# Patient Record
Sex: FEMALE | Race: ASIAN | NOT HISPANIC OR LATINO | URBAN - METROPOLITAN AREA
[De-identification: names, ages, dates, MRNs, and addresses within clinical notes are randomized per-mention and may not be internally consistent; named-entity substitution may affect disease eponyms.]

---

## 2023-04-10 ENCOUNTER — INPATIENT (INPATIENT)
Facility: HOSPITAL | Age: 73
LOS: 2 days | Discharge: HOME | End: 2023-04-13
Attending: STUDENT IN AN ORGANIZED HEALTH CARE EDUCATION/TRAINING PROGRAM | Admitting: STUDENT IN AN ORGANIZED HEALTH CARE EDUCATION/TRAINING PROGRAM
Payer: MEDICARE

## 2023-04-10 VITALS
HEIGHT: 66 IN | HEART RATE: 58 BPM | OXYGEN SATURATION: 99 % | DIASTOLIC BLOOD PRESSURE: 60 MMHG | RESPIRATION RATE: 16 BRPM | SYSTOLIC BLOOD PRESSURE: 90 MMHG | TEMPERATURE: 98 F | WEIGHT: 179.9 LBS

## 2023-04-10 LAB
ALBUMIN SERPL ELPH-MCNC: 4 G/DL — SIGNIFICANT CHANGE UP (ref 3.3–5)
ALP SERPL-CCNC: 90 U/L — SIGNIFICANT CHANGE UP (ref 40–120)
ALT FLD-CCNC: 54 U/L — SIGNIFICANT CHANGE UP (ref 12–78)
ANION GAP SERPL CALC-SCNC: 7 MMOL/L — SIGNIFICANT CHANGE UP (ref 5–17)
AST SERPL-CCNC: 32 U/L — SIGNIFICANT CHANGE UP (ref 15–37)
BASOPHILS # BLD AUTO: 0.12 K/UL — SIGNIFICANT CHANGE UP (ref 0–0.2)
BASOPHILS NFR BLD AUTO: 0.7 % — SIGNIFICANT CHANGE UP (ref 0–2)
BILIRUB SERPL-MCNC: 0.6 MG/DL — SIGNIFICANT CHANGE UP (ref 0.2–1.2)
BUN SERPL-MCNC: 27 MG/DL — HIGH (ref 7–23)
CALCIUM SERPL-MCNC: 9.3 MG/DL — SIGNIFICANT CHANGE UP (ref 8.5–10.1)
CHLORIDE SERPL-SCNC: 111 MMOL/L — HIGH (ref 96–108)
CO2 SERPL-SCNC: 23 MMOL/L — SIGNIFICANT CHANGE UP (ref 22–31)
CREAT SERPL-MCNC: 1.78 MG/DL — HIGH (ref 0.5–1.3)
EGFR: 30 ML/MIN/1.73M2 — LOW
EOSINOPHIL # BLD AUTO: 0.07 K/UL — SIGNIFICANT CHANGE UP (ref 0–0.5)
EOSINOPHIL NFR BLD AUTO: 0.4 % — SIGNIFICANT CHANGE UP (ref 0–6)
GLUCOSE SERPL-MCNC: 136 MG/DL — HIGH (ref 70–99)
HCT VFR BLD CALC: 43.4 % — SIGNIFICANT CHANGE UP (ref 34.5–45)
HGB BLD-MCNC: 14 G/DL — SIGNIFICANT CHANGE UP (ref 11.5–15.5)
IMM GRANULOCYTES NFR BLD AUTO: 0.6 % — SIGNIFICANT CHANGE UP (ref 0–0.9)
LYMPHOCYTES # BLD AUTO: 14.3 % — SIGNIFICANT CHANGE UP (ref 13–44)
LYMPHOCYTES # BLD AUTO: 2.42 K/UL — SIGNIFICANT CHANGE UP (ref 1–3.3)
MAGNESIUM SERPL-MCNC: 2.5 MG/DL — SIGNIFICANT CHANGE UP (ref 1.6–2.6)
MCHC RBC-ENTMCNC: 29.5 PG — SIGNIFICANT CHANGE UP (ref 27–34)
MCHC RBC-ENTMCNC: 32.3 G/DL — SIGNIFICANT CHANGE UP (ref 32–36)
MCV RBC AUTO: 91.4 FL — SIGNIFICANT CHANGE UP (ref 80–100)
MONOCYTES # BLD AUTO: 1.04 K/UL — HIGH (ref 0–0.9)
MONOCYTES NFR BLD AUTO: 6.1 % — SIGNIFICANT CHANGE UP (ref 2–14)
NEUTROPHILS # BLD AUTO: 13.19 K/UL — HIGH (ref 1.8–7.4)
NEUTROPHILS NFR BLD AUTO: 77.9 % — HIGH (ref 43–77)
NRBC # BLD: 0 /100 WBCS — SIGNIFICANT CHANGE UP (ref 0–0)
NT-PROBNP SERPL-SCNC: 289 PG/ML — HIGH (ref 0–125)
PLATELET # BLD AUTO: 321 K/UL — SIGNIFICANT CHANGE UP (ref 150–400)
POTASSIUM SERPL-MCNC: 3.9 MMOL/L — SIGNIFICANT CHANGE UP (ref 3.5–5.3)
POTASSIUM SERPL-SCNC: 3.9 MMOL/L — SIGNIFICANT CHANGE UP (ref 3.5–5.3)
PROT SERPL-MCNC: 7.6 GM/DL — SIGNIFICANT CHANGE UP (ref 6–8.3)
RAPID RVP RESULT: SIGNIFICANT CHANGE UP
RBC # BLD: 4.75 M/UL — SIGNIFICANT CHANGE UP (ref 3.8–5.2)
RBC # FLD: 13.4 % — SIGNIFICANT CHANGE UP (ref 10.3–14.5)
SARS-COV-2 RNA SPEC QL NAA+PROBE: SIGNIFICANT CHANGE UP
SODIUM SERPL-SCNC: 141 MMOL/L — SIGNIFICANT CHANGE UP (ref 135–145)
TROPONIN I, HIGH SENSITIVITY RESULT: 45 NG/L — SIGNIFICANT CHANGE UP
WBC # BLD: 16.95 K/UL — HIGH (ref 3.8–10.5)
WBC # FLD AUTO: 16.95 K/UL — HIGH (ref 3.8–10.5)

## 2023-04-10 PROCEDURE — 72125 CT NECK SPINE W/O DYE: CPT | Mod: 26,MA

## 2023-04-10 PROCEDURE — 99285 EMERGENCY DEPT VISIT HI MDM: CPT

## 2023-04-10 PROCEDURE — 74176 CT ABD & PELVIS W/O CONTRAST: CPT | Mod: 26,MA

## 2023-04-10 PROCEDURE — 71250 CT THORAX DX C-: CPT | Mod: 26,MA

## 2023-04-10 PROCEDURE — 93010 ELECTROCARDIOGRAM REPORT: CPT

## 2023-04-10 PROCEDURE — 71045 X-RAY EXAM CHEST 1 VIEW: CPT | Mod: 26

## 2023-04-10 PROCEDURE — 70450 CT HEAD/BRAIN W/O DYE: CPT | Mod: 26,MA

## 2023-04-10 PROCEDURE — 99223 1ST HOSP IP/OBS HIGH 75: CPT

## 2023-04-10 RX ORDER — SODIUM CHLORIDE 9 MG/ML
1000 INJECTION INTRAMUSCULAR; INTRAVENOUS; SUBCUTANEOUS
Refills: 0 | Status: DISCONTINUED | OUTPATIENT
Start: 2023-04-10 | End: 2023-04-12

## 2023-04-10 RX ORDER — TRAMADOL HYDROCHLORIDE 50 MG/1
50 TABLET ORAL EVERY 6 HOURS
Refills: 0 | Status: DISCONTINUED | OUTPATIENT
Start: 2023-04-10 | End: 2023-04-13

## 2023-04-10 RX ORDER — SIMVASTATIN 20 MG/1
20 TABLET, FILM COATED ORAL AT BEDTIME
Refills: 0 | Status: DISCONTINUED | OUTPATIENT
Start: 2023-04-10 | End: 2023-04-13

## 2023-04-10 RX ORDER — ACETAMINOPHEN 500 MG
975 TABLET ORAL ONCE
Refills: 0 | Status: COMPLETED | OUTPATIENT
Start: 2023-04-10 | End: 2023-04-10

## 2023-04-10 RX ORDER — ALPRAZOLAM 0.25 MG
0.25 TABLET ORAL
Refills: 0 | Status: DISCONTINUED | OUTPATIENT
Start: 2023-04-10 | End: 2023-04-13

## 2023-04-10 RX ORDER — ONDANSETRON 8 MG/1
4 TABLET, FILM COATED ORAL EVERY 8 HOURS
Refills: 0 | Status: DISCONTINUED | OUTPATIENT
Start: 2023-04-10 | End: 2023-04-13

## 2023-04-10 RX ORDER — LANOLIN ALCOHOL/MO/W.PET/CERES
3 CREAM (GRAM) TOPICAL AT BEDTIME
Refills: 0 | Status: DISCONTINUED | OUTPATIENT
Start: 2023-04-10 | End: 2023-04-13

## 2023-04-10 RX ORDER — ASPIRIN/CALCIUM CARB/MAGNESIUM 324 MG
81 TABLET ORAL DAILY
Refills: 0 | Status: DISCONTINUED | OUTPATIENT
Start: 2023-04-10 | End: 2023-04-13

## 2023-04-10 RX ORDER — ACETAMINOPHEN 500 MG
650 TABLET ORAL EVERY 6 HOURS
Refills: 0 | Status: DISCONTINUED | OUTPATIENT
Start: 2023-04-10 | End: 2023-04-13

## 2023-04-10 RX ORDER — SODIUM CHLORIDE 9 MG/ML
500 INJECTION INTRAMUSCULAR; INTRAVENOUS; SUBCUTANEOUS ONCE
Refills: 0 | Status: COMPLETED | OUTPATIENT
Start: 2023-04-10 | End: 2023-04-10

## 2023-04-10 RX ADMIN — Medication 975 MILLIGRAM(S): at 19:00

## 2023-04-10 RX ADMIN — Medication 975 MILLIGRAM(S): at 18:00

## 2023-04-10 RX ADMIN — SODIUM CHLORIDE 500 MILLILITER(S): 9 INJECTION INTRAMUSCULAR; INTRAVENOUS; SUBCUTANEOUS at 20:18

## 2023-04-10 NOTE — ED PROVIDER NOTE - PHYSICAL EXAMINATION
Gen: NAD, AOx3, able to make needs known, non-toxic  Head: NCAT  HEENT: EOMI, oral mucosa moist, normal conjunctiva  Lung: CTAB, no respiratory distress, no wheezes/rhonchi/rales B/L, speaking in full sentences  CV: RRR, no murmurs  Abd: non distended, soft, nontender, no guarding, no CVA tenderness  MSK: no visible deformities. no midline spinal tenderness. small area of ecchymosis w/ associated tenderness L lower posterio-lateral ribs  Neuro: Appears non focal. Cn 2-12 grossly intact b/l. str 5/5 x4  Skin: Warm, well perfused  Psych: normal affect

## 2023-04-10 NOTE — ED PROVIDER NOTE - OBJECTIVE STATEMENT
71 y/o F w/ PMH of HTN presenting w/ syncope. Seen w/ sister and niece. Reports was getting up from the toilet today when she lost consciousness. Denies any prodrome symptoms. Woke up on the floor. Tried to get up and lost consciousness again. Endorsing pain in the back of the head, believe she hit it on the wall. Denies AC use. Reports had felt well earlier today and over the past few days. Had passed out once before a few years ago. Endorsing feeling tired at this time. Denies fevers, chills, dizziness, blurred vision, chest pain, cough, shortness of breath, abdominal pain, n/v/d/c, urinary symptoms, MSK pain, rash.

## 2023-04-10 NOTE — H&P ADULT - HISTORY OF PRESENT ILLNESS
73 y/o F w/ PMH of HTN presenting w/ syncope. Seen w/ sister and niece. Reports was getting up from the toilet today when she lost consciousness. Denies any prodrome symptoms. Woke up on the floor. Tried to get up and lost consciousness again. Endorsing pain in the back of the head, believe she hit it on the wall. Denies AC use. Reports had felt well earlier today and over the past few days. Had passed out once before a few years ago. Endorsing feeling tired at this time. Denies fevers, chills, dizziness, blurred vision, chest pain, cough, shortness of breath, abdominal pain, n/v/d/c, urinary symptoms, MSK pain, rash. this is a 73 yo F hx HTN presenting due to syncope. Patient was getting up from the toilet when her vision turned dark and she passed out. when she woke up she tried to get up and passed out again. no other prodrome of light handedness dizziness, palpitations, sob, cp, diaphoresis, nausea. had one similar past syncope years ago never worked up. may have hit her head and complains of occipital pain,  Denies AC use. Denies fevers, chills, dizziness, blurred vision, chest pain, cough, shortness of breath, abdominal pain, n/v/d/c, urinary symptoms, MSK pain, rash.  presenting bp soft 90/60. labs significant for wbc 16.9, creat 1.78, trop (-). no past labs to compare available. ekg sb 54. xrays show possible L lower ribs and r 7th rib fractures. given 500 cc ns in ed.

## 2023-04-10 NOTE — ED PROVIDER NOTE - CLINICAL SUMMARY MEDICAL DECISION MAKING FREE TEXT BOX
71 y/o F w/ PMH as above presenting w/ syncope. Pt overall well appearing, no acute distress. Reportedly hypotensive w/ EMS, now improving. Possible vasovagal given episode occurred when going from seated to standing position. However given no prodromal symptoms, concerned for arrhythmogenic syncope. Will obtain imaging to eval for traumatic findings from fall. Will eval for cardiac abnormalities w/ troponin and EKG. Will eval for metabolic vs. infectious causes of symptoms. Plan for labs, imaging, EKG, meds. Will reassess the need for additional interventions as clinically warranted.

## 2023-04-10 NOTE — ED ADULT NURSE NOTE - OBJECTIVE STATEMENT
Patient is alert and oriented x4. Reports going to the bathroom because she had difficulty urinating and then passed out. Reports hitting back of the head. No obvious injury noted at this time. Complains of lower abdominal pain and lower back pain. PMH HTN. Arrived with G20 from EMS. Placed on cardiac monitor. Current /53, HR-57.

## 2023-04-10 NOTE — ED ADULT NURSE NOTE - NSIMPLEMENTINTERV_GEN_ALL_ED
Implemented All Fall Risk Interventions:  [place] to call system. Call bell, personal items and telephone within reach. Instruct patient to call for assistance. Room bathroom lighting operational. Non-slip footwear when patient is off stretcher. Physically safe environment: no spills, clutter or unnecessary equipment. Stretcher in lowest position, wheels locked, appropriate side rails in place. Provide visual cue, wrist band, yellow gown, etc. Monitor gait and stability. Monitor for mental status changes and reorient to person, place, and time. Review medications for side effects contributing to fall risk. Reinforce activity limits and safety measures with patient and family.

## 2023-04-10 NOTE — H&P ADULT - NSHPPHYSICALEXAM_GEN_ALL_CORE
constitutional: NAD AAOx3  HEENT: PERRLA EOMI  CV: RRR S1S2  Pulm: CTA b/l  GI: soft nontender nondistended + BS   Neuro: CN II-XII grossly intact   Musculoskeletal: no pedal edema or calf tenderness b/l
You can access the FollowMyHealth Patient Portal offered by [facility] by registering at the following website: [email or web address]. By joining [employer]’s FollowMyHealth portal, you will also be able to view your health information using other applications (apps) compatible with our system.

## 2023-04-10 NOTE — H&P ADULT - ASSESSMENT
syncope   LIZBETH   leukocytosis  acute rib fractures  -admit to telemetry   -soft BP. check orthostatic vitals  -TTE  -tsh, lipid panel, a1c  -kidney bladder US  -UA pending. start empiric levaquin (pen allergy); leukocytosis may be reactive to rib fractures add on procal to decide whether to continue abx.     -s/p 500 cc ns in ed, CW IV hydration. monitor creat   -regular diet   -scds  -PT eval    this is a 71 yo F hx HTN presenting due to syncope. Patient was getting up from the toilet when her vision turned dark and she passed out. when she woke up she tried to get up and passed out again. no other prodrome of light handedness dizziness, palpitations, sob, cp, diaphoresis, nausea. had one similar past syncope years ago never worked up. may have hit her head and complains of occipital pain,  Denies AC use. Denies fevers, chills, dizziness, blurred vision, chest pain, cough, shortness of breath, abdominal pain, n/v/d/c, urinary symptoms, MSK pain, rash.  presenting bp soft 90/60. labs significant for wbc 16.9, creat 1.78, trop (-). no past labs to compare available. ekg sb 54. xrays show possible L lower ribs and r 7th rib fractures. given 500 cc ns in ed.     syncope   LIZBETH   leukocytosis/sepsis   acute rib fractures  -admit to telemetry   -soft BP. check orthostatic vitals  -TTE  -tsh, lipid panel, a1c  -kidney bladder US  -UA pending. start empiric levaquin (pen allergy); leukocytosis may be reactive to rib fractures add on procal to decide whether to continue abx.     -s/p 500 cc ns in ed, CW IV hydration. monitor creat   -pain control w tylenol, ultram  -regular diet   -scds  -PT eval

## 2023-04-10 NOTE — PATIENT PROFILE ADULT - FALL HARM RISK - HARM RISK INTERVENTIONS
Assistance with ambulation/Assistance OOB with selected safe patient handling equipment/Communicate Risk of Fall with Harm to all staff/Discuss with provider need for PT consult/Monitor gait and stability/Reinforce activity limits and safety measures with patient and family/Tailored Fall Risk Interventions/Visual Cue: Yellow wristband and red socks/Bed in lowest position, wheels locked, appropriate side rails in place/Call bell, personal items and telephone in reach/Instruct patient to call for assistance before getting out of bed or chair/Non-slip footwear when patient is out of bed/[place] to call system/Physically safe environment - no spills, clutter or unnecessary equipment/Purposeful Proactive Rounding/Room/bathroom lighting operational, light cord in reach

## 2023-04-11 DIAGNOSIS — R29.898 OTHER SYMPTOMS AND SIGNS INVOLVING THE MUSCULOSKELETAL SYSTEM: ICD-10-CM

## 2023-04-11 DIAGNOSIS — N17.9 ACUTE KIDNEY FAILURE, UNSPECIFIED: ICD-10-CM

## 2023-04-11 DIAGNOSIS — R55 SYNCOPE AND COLLAPSE: ICD-10-CM

## 2023-04-11 DIAGNOSIS — A41.9 SEPSIS, UNSPECIFIED ORGANISM: ICD-10-CM

## 2023-04-11 LAB
A1C WITH ESTIMATED AVERAGE GLUCOSE RESULT: 6.2 % — HIGH (ref 4–5.6)
ALBUMIN SERPL ELPH-MCNC: 3.3 G/DL — SIGNIFICANT CHANGE UP (ref 3.3–5)
ALP SERPL-CCNC: 73 U/L — SIGNIFICANT CHANGE UP (ref 40–120)
ALT FLD-CCNC: 45 U/L — SIGNIFICANT CHANGE UP (ref 12–78)
ANION GAP SERPL CALC-SCNC: 8 MMOL/L — SIGNIFICANT CHANGE UP (ref 5–17)
APPEARANCE UR: CLEAR — SIGNIFICANT CHANGE UP
AST SERPL-CCNC: 24 U/L — SIGNIFICANT CHANGE UP (ref 15–37)
BACTERIA # UR AUTO: ABNORMAL
BASOPHILS # BLD AUTO: 0.07 K/UL — SIGNIFICANT CHANGE UP (ref 0–0.2)
BASOPHILS NFR BLD AUTO: 0.8 % — SIGNIFICANT CHANGE UP (ref 0–2)
BILIRUB SERPL-MCNC: 0.5 MG/DL — SIGNIFICANT CHANGE UP (ref 0.2–1.2)
BILIRUB UR-MCNC: NEGATIVE — SIGNIFICANT CHANGE UP
BUN SERPL-MCNC: 33 MG/DL — HIGH (ref 7–23)
CALCIUM SERPL-MCNC: 8.6 MG/DL — SIGNIFICANT CHANGE UP (ref 8.5–10.1)
CHLORIDE SERPL-SCNC: 113 MMOL/L — HIGH (ref 96–108)
CHOLEST SERPL-MCNC: 158 MG/DL — SIGNIFICANT CHANGE UP
CO2 SERPL-SCNC: 20 MMOL/L — LOW (ref 22–31)
COLOR SPEC: YELLOW — SIGNIFICANT CHANGE UP
CREAT SERPL-MCNC: 1.27 MG/DL — SIGNIFICANT CHANGE UP (ref 0.5–1.3)
DIFF PNL FLD: NEGATIVE — SIGNIFICANT CHANGE UP
EGFR: 44 ML/MIN/1.73M2 — LOW
EOSINOPHIL # BLD AUTO: 0.07 K/UL — SIGNIFICANT CHANGE UP (ref 0–0.5)
EOSINOPHIL NFR BLD AUTO: 0.8 % — SIGNIFICANT CHANGE UP (ref 0–6)
EPI CELLS # UR: SIGNIFICANT CHANGE UP
ESTIMATED AVERAGE GLUCOSE: 131 MG/DL — HIGH (ref 68–114)
GLUCOSE SERPL-MCNC: 84 MG/DL — SIGNIFICANT CHANGE UP (ref 70–99)
GLUCOSE UR QL: 50 MG/DL
HCT VFR BLD CALC: 37 % — SIGNIFICANT CHANGE UP (ref 34.5–45)
HCV AB S/CO SERPL IA: 0.17 S/CO — SIGNIFICANT CHANGE UP (ref 0–0.99)
HCV AB SERPL-IMP: SIGNIFICANT CHANGE UP
HDLC SERPL-MCNC: 81 MG/DL — SIGNIFICANT CHANGE UP
HGB BLD-MCNC: 12 G/DL — SIGNIFICANT CHANGE UP (ref 11.5–15.5)
IMM GRANULOCYTES NFR BLD AUTO: 0.4 % — SIGNIFICANT CHANGE UP (ref 0–0.9)
KETONES UR-MCNC: NEGATIVE — SIGNIFICANT CHANGE UP
LEUKOCYTE ESTERASE UR-ACNC: ABNORMAL
LIPID PNL WITH DIRECT LDL SERPL: 61 MG/DL — SIGNIFICANT CHANGE UP
LYMPHOCYTES # BLD AUTO: 2.42 K/UL — SIGNIFICANT CHANGE UP (ref 1–3.3)
LYMPHOCYTES # BLD AUTO: 26 % — SIGNIFICANT CHANGE UP (ref 13–44)
MAGNESIUM SERPL-MCNC: 2.3 MG/DL — SIGNIFICANT CHANGE UP (ref 1.6–2.6)
MCHC RBC-ENTMCNC: 29.8 PG — SIGNIFICANT CHANGE UP (ref 27–34)
MCHC RBC-ENTMCNC: 32.4 G/DL — SIGNIFICANT CHANGE UP (ref 32–36)
MCV RBC AUTO: 91.8 FL — SIGNIFICANT CHANGE UP (ref 80–100)
MONOCYTES # BLD AUTO: 0.87 K/UL — SIGNIFICANT CHANGE UP (ref 0–0.9)
MONOCYTES NFR BLD AUTO: 9.4 % — SIGNIFICANT CHANGE UP (ref 2–14)
NEUTROPHILS # BLD AUTO: 5.82 K/UL — SIGNIFICANT CHANGE UP (ref 1.8–7.4)
NEUTROPHILS NFR BLD AUTO: 62.6 % — SIGNIFICANT CHANGE UP (ref 43–77)
NITRITE UR-MCNC: NEGATIVE — SIGNIFICANT CHANGE UP
NON HDL CHOLESTEROL: 77 MG/DL — SIGNIFICANT CHANGE UP
NRBC # BLD: 0 /100 WBCS — SIGNIFICANT CHANGE UP (ref 0–0)
PH UR: 5 — SIGNIFICANT CHANGE UP (ref 5–8)
PHOSPHATE SERPL-MCNC: 3 MG/DL — SIGNIFICANT CHANGE UP (ref 2.5–4.5)
PLATELET # BLD AUTO: 305 K/UL — SIGNIFICANT CHANGE UP (ref 150–400)
POTASSIUM SERPL-MCNC: 3.7 MMOL/L — SIGNIFICANT CHANGE UP (ref 3.5–5.3)
POTASSIUM SERPL-SCNC: 3.7 MMOL/L — SIGNIFICANT CHANGE UP (ref 3.5–5.3)
PROCALCITONIN SERPL-MCNC: 0.2 NG/ML — HIGH (ref 0.02–0.1)
PROT SERPL-MCNC: 6.7 GM/DL — SIGNIFICANT CHANGE UP (ref 6–8.3)
PROT UR-MCNC: 30 MG/DL
RBC # BLD: 4.03 M/UL — SIGNIFICANT CHANGE UP (ref 3.8–5.2)
RBC # FLD: 13.5 % — SIGNIFICANT CHANGE UP (ref 10.3–14.5)
RBC CASTS # UR COMP ASSIST: ABNORMAL /HPF (ref 0–4)
SODIUM SERPL-SCNC: 141 MMOL/L — SIGNIFICANT CHANGE UP (ref 135–145)
SP GR SPEC: 1.02 — SIGNIFICANT CHANGE UP (ref 1.01–1.02)
TRIGL SERPL-MCNC: 81 MG/DL — SIGNIFICANT CHANGE UP
TROPONIN I, HIGH SENSITIVITY RESULT: 33.8 NG/L — SIGNIFICANT CHANGE UP
TSH SERPL-MCNC: 0.41 UIU/ML — SIGNIFICANT CHANGE UP (ref 0.36–3.74)
UROBILINOGEN FLD QL: NEGATIVE MG/DL — SIGNIFICANT CHANGE UP
WBC # BLD: 9.29 K/UL — SIGNIFICANT CHANGE UP (ref 3.8–10.5)
WBC # FLD AUTO: 9.29 K/UL — SIGNIFICANT CHANGE UP (ref 3.8–10.5)
WBC UR QL: SIGNIFICANT CHANGE UP

## 2023-04-11 PROCEDURE — 99232 SBSQ HOSP IP/OBS MODERATE 35: CPT

## 2023-04-11 PROCEDURE — 76770 US EXAM ABDO BACK WALL COMP: CPT | Mod: 26

## 2023-04-11 PROCEDURE — 93306 TTE W/DOPPLER COMPLETE: CPT | Mod: 26

## 2023-04-11 RX ADMIN — SODIUM CHLORIDE 150 MILLILITER(S): 9 INJECTION INTRAMUSCULAR; INTRAVENOUS; SUBCUTANEOUS at 11:17

## 2023-04-11 RX ADMIN — SODIUM CHLORIDE 150 MILLILITER(S): 9 INJECTION INTRAMUSCULAR; INTRAVENOUS; SUBCUTANEOUS at 00:15

## 2023-04-11 RX ADMIN — Medication 3 MILLIGRAM(S): at 00:15

## 2023-04-11 RX ADMIN — SODIUM CHLORIDE 150 MILLILITER(S): 9 INJECTION INTRAMUSCULAR; INTRAVENOUS; SUBCUTANEOUS at 05:58

## 2023-04-11 RX ADMIN — SODIUM CHLORIDE 150 MILLILITER(S): 9 INJECTION INTRAMUSCULAR; INTRAVENOUS; SUBCUTANEOUS at 21:38

## 2023-04-11 RX ADMIN — Medication 81 MILLIGRAM(S): at 11:17

## 2023-04-11 RX ADMIN — SIMVASTATIN 20 MILLIGRAM(S): 20 TABLET, FILM COATED ORAL at 21:39

## 2023-04-11 RX ADMIN — SODIUM CHLORIDE 150 MILLILITER(S): 9 INJECTION INTRAMUSCULAR; INTRAVENOUS; SUBCUTANEOUS at 02:37

## 2023-04-11 NOTE — PHYSICAL THERAPY INITIAL EVALUATION ADULT - ADDITIONAL COMMENTS
pt encountered in bed supine aaox4, pt lb with her sister's family in private home with 5 steps to enter through the front and 2 steps to enter from the back. PLOF is indep in ADL, transfers and ambulation w/o AD ad-lid. pt indep in bed mob and ADL transfers to bathroom for ADL, and was able to amb without AD x 40ft, unable to amb further sec to pt going for ECHO test and pt is hooked up to iv-line. no gait deviation noted. pt reports mod pain to left flank.

## 2023-04-12 LAB
ANION GAP SERPL CALC-SCNC: 5 MMOL/L — SIGNIFICANT CHANGE UP (ref 5–17)
BASOPHILS # BLD AUTO: 0.08 K/UL — SIGNIFICANT CHANGE UP (ref 0–0.2)
BASOPHILS NFR BLD AUTO: 1 % — SIGNIFICANT CHANGE UP (ref 0–2)
BUN SERPL-MCNC: 18 MG/DL — SIGNIFICANT CHANGE UP (ref 7–23)
CALCIUM SERPL-MCNC: 9.4 MG/DL — SIGNIFICANT CHANGE UP (ref 8.5–10.1)
CHLORIDE SERPL-SCNC: 114 MMOL/L — HIGH (ref 96–108)
CO2 SERPL-SCNC: 23 MMOL/L — SIGNIFICANT CHANGE UP (ref 22–31)
CREAT SERPL-MCNC: 0.86 MG/DL — SIGNIFICANT CHANGE UP (ref 0.5–1.3)
EGFR: 71 ML/MIN/1.73M2 — SIGNIFICANT CHANGE UP
EOSINOPHIL # BLD AUTO: 0.18 K/UL — SIGNIFICANT CHANGE UP (ref 0–0.5)
EOSINOPHIL NFR BLD AUTO: 2.2 % — SIGNIFICANT CHANGE UP (ref 0–6)
GLUCOSE SERPL-MCNC: 85 MG/DL — SIGNIFICANT CHANGE UP (ref 70–99)
HCT VFR BLD CALC: 41.5 % — SIGNIFICANT CHANGE UP (ref 34.5–45)
HGB BLD-MCNC: 13.6 G/DL — SIGNIFICANT CHANGE UP (ref 11.5–15.5)
IMM GRANULOCYTES NFR BLD AUTO: 0.2 % — SIGNIFICANT CHANGE UP (ref 0–0.9)
LYMPHOCYTES # BLD AUTO: 3.38 K/UL — HIGH (ref 1–3.3)
LYMPHOCYTES # BLD AUTO: 41 % — SIGNIFICANT CHANGE UP (ref 13–44)
MCHC RBC-ENTMCNC: 29.5 PG — SIGNIFICANT CHANGE UP (ref 27–34)
MCHC RBC-ENTMCNC: 32.8 G/DL — SIGNIFICANT CHANGE UP (ref 32–36)
MCV RBC AUTO: 90 FL — SIGNIFICANT CHANGE UP (ref 80–100)
MONOCYTES # BLD AUTO: 0.62 K/UL — SIGNIFICANT CHANGE UP (ref 0–0.9)
MONOCYTES NFR BLD AUTO: 7.5 % — SIGNIFICANT CHANGE UP (ref 2–14)
NEUTROPHILS # BLD AUTO: 3.96 K/UL — SIGNIFICANT CHANGE UP (ref 1.8–7.4)
NEUTROPHILS NFR BLD AUTO: 48.1 % — SIGNIFICANT CHANGE UP (ref 43–77)
NRBC # BLD: 0 /100 WBCS — SIGNIFICANT CHANGE UP (ref 0–0)
PLATELET # BLD AUTO: 335 K/UL — SIGNIFICANT CHANGE UP (ref 150–400)
POTASSIUM SERPL-MCNC: 3.8 MMOL/L — SIGNIFICANT CHANGE UP (ref 3.5–5.3)
POTASSIUM SERPL-SCNC: 3.8 MMOL/L — SIGNIFICANT CHANGE UP (ref 3.5–5.3)
RBC # BLD: 4.61 M/UL — SIGNIFICANT CHANGE UP (ref 3.8–5.2)
RBC # FLD: 13.8 % — SIGNIFICANT CHANGE UP (ref 10.3–14.5)
SODIUM SERPL-SCNC: 142 MMOL/L — SIGNIFICANT CHANGE UP (ref 135–145)
WBC # BLD: 8.24 K/UL — SIGNIFICANT CHANGE UP (ref 3.8–10.5)
WBC # FLD AUTO: 8.24 K/UL — SIGNIFICANT CHANGE UP (ref 3.8–10.5)

## 2023-04-12 PROCEDURE — 99223 1ST HOSP IP/OBS HIGH 75: CPT

## 2023-04-12 PROCEDURE — 99232 SBSQ HOSP IP/OBS MODERATE 35: CPT

## 2023-04-12 RX ORDER — AMLODIPINE BESYLATE 2.5 MG/1
5 TABLET ORAL DAILY
Refills: 0 | Status: DISCONTINUED | OUTPATIENT
Start: 2023-04-12 | End: 2023-04-13

## 2023-04-12 RX ADMIN — AMLODIPINE BESYLATE 5 MILLIGRAM(S): 2.5 TABLET ORAL at 06:51

## 2023-04-12 RX ADMIN — Medication 81 MILLIGRAM(S): at 11:39

## 2023-04-12 RX ADMIN — SIMVASTATIN 20 MILLIGRAM(S): 20 TABLET, FILM COATED ORAL at 21:36

## 2023-04-12 NOTE — CONSULT NOTE ADULT - SUBJECTIVE AND OBJECTIVE BOX
CARDIOLOGY CONSULTATION NOTE                                                                               MIKAYLA SULLIVAN is a 72y Female with hx HTN presenting due to syncope. Patient was getting up from the toilet when her vision turned dark and she passed out. when she woke up she tried to get up and passed out again. no other prodrome of light handedness dizziness, palpitations, sob, cp, diaphoresis, nausea. had one similar past syncope years ago never worked up. may have hit her head and complains of occipital pain,  Denies AC use. Denies fevers, chills, dizziness, blurred vision, chest pain, cough, shortness of breath, abdominal pain, n/v/d/c, urinary symptoms, MSK pain, rash.  presenting bp soft 90/60. labs significant for wbc 16.9, creat 1.78, trop (-). no past labs to compare available. ekg sb 54. xrays show possible L lower ribs and r 7th rib fractures. given 500 cc ns in ed.  (10 Apr 2023 23:48)      REVIEW OF SYSTEMS:  -----------------------------    CONSTITUTIONAL: No fever, weight loss, or fatigue  EYES: No eye pain, visual disturbances, or discharge  ENMT:  No difficulty hearing, tinnitus, vertigo; No sinus or throat pain  NECK: No pain or stiffness  BREASTS: No pain, masses, or nipple discharge  RESPIRATORY: No cough, wheezing, chills or hemoptysis; No shortness of breath  CARDIOVASCULAR: See HPI  GASTROINTESTINAL: No abdominal or epigastric pain. No nausea, vomiting, or hematemesis; No diarrhea or constipation. No melena or hematochezia.  GENITOURINARY: No dysuria, frequency, hematuria, or incontinence  NEUROLOGICAL: No headaches, memory loss, loss of strength, numbness, or tremors  SKIN: No itching, burning, rashes, or lesions   LYMPH NODES: No enlarged glands  ENDOCRINE: No heat or cold intolerance; No hair loss  MUSCULOSKELETAL: No joint pain or swelling; No muscle, back, or extremity pain  PSYCHIATRIC: No depression, anxiety, mood swings, or difficulty sleeping  HEME/LYMPH: No easy bruising, or bleeding gums  ALLERGY AND IMMUNOLOGIC: No hives or eczema    Home Medications:  ALPRAZolam 0.25 mg oral tablet: 1 tab(s) orally 4 times a day, As needed, Anxiety (10 Apr 2023 17:59)  amLODIPine 5 mg oral tablet: 1 tab(s) orally once a day (10 Apr 2023 17:59)  aspirin 81 mg oral tablet, chewable: 1 tab(s) orally once a day (10 Apr 2023 17:59)  hydrochlorothiazide 25 mg oral tablet: 1 tab(s) orally once a day (10 Apr 2023 17:59)  simvastatin 20 mg oral tablet: 1 tab(s) orally once a day (at bedtime) (10 Apr 2023 17:59)      MEDICATIONS  (STANDING):  amLODIPine   Tablet 5 milliGRAM(s) Oral daily  aspirin  chewable 81 milliGRAM(s) Oral daily  simvastatin 20 milliGRAM(s) Oral at bedtime      ALLERGIES: iv dye (Hives)  penicillin (Hives)      FAMILY HISTORY:  No pertinent family history in first degree relatives        PHYSICAL EXAMINATION:  -----------------------------  T(C): 36.9 (04-12-23 @ 10:30), Max: 37.2 (04-11-23 @ 15:12)  HR: 59 (04-12-23 @ 10:30) (48 - 66)  BP: 166/88 (04-12-23 @ 10:30) (121/69 - 174/75)  RR: 17 (04-12-23 @ 10:30) (17 - 18)  SpO2: 96% (04-12-23 @ 10:30) (96% - 98%)  Wt(kg): --    04-11 @ 07:01  -  04-12 @ 07:00  --------------------------------------------------------  IN:    Oral Fluid: 480 mL    sodium chloride 0.9%: 2050 mL  Total IN: 2530 mL    OUT:    Voided (mL): 2300 mL  Total OUT: 2300 mL    Total NET: 230 mL      04-12 @ 07:01  -  04-12 @ 14:46  --------------------------------------------------------  IN:    Oral Fluid: 360 mL  Total IN: 360 mL    OUT:  Total OUT: 0 mL    Total NET: 360 mL            Constitutional: well developed, normal appearance, well groomed, well nourished, no deformities and no acute distress.   Eyes: the conjunctiva exhibited no abnormalities and the eyelids demonstrated no xanthelasmas.   HEENT: normal oral mucosa, no oral pallor and no oral cyanosis.   Neck: normal jugular venous A waves present, normal jugular venous V waves present and no jugular venous atkinson A waves.   Pulmonary: no respiratory distress, normal respiratory rhythm and effort, no accessory muscle use and lungs were clear to auscultation bilaterally.   Cardiovascular: heart rate and rhythm were normal, normal S1 and S2 and no murmur, gallop, rub, heave or thrill are present.   Abdomen: soft, non-tender, no hepato-splenomegaly and no abdominal mass palpated.   Musculoskeletal: the gait could not be assessed..   Extremities: no clubbing of the fingernails, no localized cyanosis, no petechial hemorrhages and no ischemic changes.   Skin: normal skin color and pigmentation, no rash, no venous stasis, no skin lesions, no skin ulcer and no xanthoma was observed.   Psychiatric: oriented to person, place, and time, the affect was normal, the mood was normal and not feeling anxious.     ECG:  -------        LABS:   --------  04-12    142  |  114<H>  |  18  ----------------------------<  85  3.8   |  23  |  0.86    Ca    9.4      12 Apr 2023 05:30  Phos  3.0     04-11  Mg     2.3     04-11    TPro  6.7  /  Alb  3.3  /  TBili  0.5  /  DBili  x   /  AST  24  /  ALT  45  /  AlkPhos  73  04-11                         13.6   8.24  )-----------( 335      ( 12 Apr 2023 05:30 )             41.5           158 mg/dL, --, 81 mg/dL, 81 mg/dL        RADIOLOGY REPORTS:  -----------------------------        ECHOCARDIOGRAM:  ---------------------------         CARDIOLOGY CONSULTATION NOTE                                                                             MIKAYLA SULLIVAN is a 72y Female with hx HTN presenting due to syncope. Patient was getting up from the toilet after micturition, walking to her bedroom she experienced lightheadedness and then syncope. +trauma to back, possibly head No other prodrome of palpitations, sob, cp, diaphoresis, nausea. Has had similar episodes of syncope years ago; had workup with Dr. Christiane Macedo including stress test, echo and monitor. Denies fevers, chills, dizziness, blurred vision, chest pain, cough, shortness of breath, abdominal pain, n/v/d/c, urinary symptoms, MSK pain, rash. presenting bp soft 90/60. labs significant for wbc 16.9, creat 1.78, trop (-). no past labs to compare available. ekg sb 54. xrays show possible L lower ribs and r 7th rib fractures. When questioned, sister says that she used to have multiple syncopal events as a teenager as well.  REVIEW OF SYSTEMS: -----------------------------  CONSTITUTIONAL: No fever, weight loss, or fatigue EYES: No eye pain, visual disturbances, or discharge ENMT:  No difficulty hearing, tinnitus, vertigo; No sinus or throat pain NECK: No pain or stiffness BREASTS: No pain, masses, or nipple discharge RESPIRATORY: No cough, wheezing, chills or hemoptysis; No shortness of breath CARDIOVASCULAR: See HPI GASTROINTESTINAL: No abdominal or epigastric pain. No nausea, vomiting, or hematemesis; No diarrhea or constipation. No melena or hematochezia. GENITOURINARY: No dysuria, frequency, hematuria, or incontinence NEUROLOGICAL: No headaches, memory loss, loss of strength, numbness, or tremors SKIN: No itching, burning, rashes, or lesions  LYMPH NODES: No enlarged glands ENDOCRINE: No heat or cold intolerance; No hair loss MUSCULOSKELETAL: No joint pain or swelling; No muscle, back, or extremity pain PSYCHIATRIC: No depression, anxiety, mood swings, or difficulty sleeping HEME/LYMPH: No easy bruising, or bleeding gums ALLERGY AND IMMUNOLOGIC: No hives or eczema  Home Medications: ALPRAZolam 0.25 mg oral tablet: 1 tab(s) orally 4 times a day, As needed, Anxiety (10 Apr 2023 17:59) amLODIPine 5 mg oral tablet: 1 tab(s) orally once a day (10 Apr 2023 17:59) aspirin 81 mg oral tablet, chewable: 1 tab(s) orally once a day (10 Apr 2023 17:59) hydrochlorothiazide 25 mg oral tablet: 1 tab(s) orally once a day (10 Apr 2023 17:59) simvastatin 20 mg oral tablet: 1 tab(s) orally once a day (at bedtime) (10 Apr 2023 17:59)   MEDICATIONS  (STANDING): amLODIPine   Tablet 5 milliGRAM(s) Oral daily aspirin  chewable 81 milliGRAM(s) Oral daily simvastatin 20 milliGRAM(s) Oral at bedtime   ALLERGIES: iv dye (Hives) penicillin (Hives)   FAMILY HISTORY: No pertinent family history in first degree relatives    PHYSICAL EXAMINATION: ----------------------------- T(C): 36.9 (23 @ 10:30), Max: 37.2 (23 @ 15:12) HR: 59 (23 @ 10:30) (48 - 66) BP: 166/88 (23 @ 10:30) (121/69 - 174/75) RR: 17 (23 @ 10:30) (17 - 18) SpO2: 96% (23 @ 10:30) (96% - 98%) Wt(kg): --   @ 07:01  -   @ 07:00 -------------------------------------------------------- IN:   Oral Fluid: 480 mL   sodium chloride 0.9%: 2050 mL Total IN: 2530 mL  OUT:   Voided (mL): 2300 mL Total OUT: 2300 mL  Total NET: 230 mL    @ 07:01  -   @ 14:46 -------------------------------------------------------- IN:   Oral Fluid: 360 mL Total IN: 360 mL  OUT: Total OUT: 0 mL  Total NET: 360 mL      Constitutional: well developed, normal appearance, well groomed, well nourished, no deformities and no acute distress.  Eyes: the conjunctiva exhibited no abnormalities and the eyelids demonstrated no xanthelasmas.  HEENT: normal oral mucosa, no oral pallor and no oral cyanosis.  Neck: normal jugular venous A waves present, normal jugular venous V waves present and no jugular venous atkinson A waves.  Pulmonary: no respiratory distress, normal respiratory rhythm and effort, no accessory muscle use and lungs were clear to auscultation bilaterally.  Cardiovascular: heart rate and rhythm were normal, normal S1 and S2 and no murmur, gallop, rub, heave or thrill are present.  Abdomen: soft, non-tender, no hepato-splenomegaly and no abdominal mass palpated.  Musculoskeletal: the gait could not be assessed..  Extremities: no clubbing of the fingernails, no localized cyanosis, no petechial hemorrhages and no ischemic changes.  Skin: normal skin color and pigmentation, no rash, no venous stasis, no skin lesions, no skin ulcer and no xanthoma was observed.  Psychiatric: oriented to person, place, and time, the affect was normal, the mood was normal and not feeling anxious.   ECG: ------- < from: 12 Lead ECG (04.10.23 @ 16:09) >  Ventricular Rate 54 BPM  Atrial Rate 54 BPM  P-R Interval 142 ms  QRS Duration 84 ms  Q-T Interval 484 ms  QTC Calculation(Bazett) 458 ms  P Axis 33 degrees  R Axis 8 degrees  T Axis 20 degrees  Diagnosis Line Sinus bradycardia Moderate voltage criteria for LVH, may be normal variant Borderline ECG No previous ECGs available Confirmed by CALVIN WALLER MD ([id number]) on 2023 2:39:03 PM  < end of copied text >    LABS:  --------   142  |  114<H>  |  18 ----------------------------<  85 3.8   |  23  |  0.86  Ca    9.4      2023 05:30 Phos  3.0     04-11 Mg     2.3     04-11  TPro  6.7  /  Alb  3.3  /  TBili  0.5  /  DBili  x   /  AST  24  /  ALT  45  /  AlkPhos  73  04-                       13.6  8.24  )-----------( 335      ( 2023 05:30 )            41.5       158 mg/dL, --, 81 mg/dL, 81 mg/dL    RADIOLOGY REPORTS: -----------------------------  < from: CT Chest No Cont (04.10.23 @ 20:03) >  ACC: [id number] EXAM:  CT ABDOMEN AND PELVIS   ORDERED BY: LUTHER CAST   ACC: [id number] EXAM:  CT CHEST   ORDERED BY: LUTHER CAST   PROCEDURE DATE:  04/10/2023      INTERPRETATION:  CLINICAL INFORMATION: Left posterior lateral rib pain after fall. Elevated white blood cell count and loose stools.  COMPARISON: None.  CONTRAST/COMPLICATIONS: IV Contrast: NONE Oral Contrast: NONE Complications: None reported at time of study completion  PROCEDURE: CT of the Chest, Abdomen and Pelvis was performed. Sagittal and coronal reformats were performed.  FINDINGS: CHEST: LUNGS AND LARGE AIRWAYS: Patent central airways. No pulmonary nodules. PLEURA: No pleural effusion. VESSELS: Atherosclerotic changes of the aorta. HEART: Heart size is normal. No pericardial effusion. MEDIASTINUM AND BETO: No lymphadenopathy. CHEST WALL AND LOWER NECK: Within normal limits.  ABDOMEN AND PELVIS: LIVER: Steatosis. Probable hepatic cysts. BILE DUCTS: Normal caliber. GALLBLADDER: Probable gallstone at the fundus. SPLEEN: Within normal limits. PANCREAS: Within normal limits. ADRENALS: Within normal limits. KIDNEYS/URETERS: No hydronephrosis. Right renal cysts.  BLADDER: Within normal limits. REPRODUCTIVE ORGANS: Uterus and adnexa within normal limits.  BOWEL: No bowel obstruction. Colonic diverticulosis. PERITONEUM: No ascites. VESSELS: Atherosclerotic changes. RETROPERITONEUM/LYMPH NODES: No lymphadenopathy. ABDOMINAL WALL: Ventral wall hernia mesh. BONES: Probable nondisplaced fracture of the lateral right seventh rib.  Possible nondisplaced fractures of the lateral aspect of the left fourth  through sixth ribs. Degenerative changes. Sclerotic lesion in the T8  vertebral body.  IMPRESSION: Probablenondisplaced fracture of the lateral right seventh rib. Possible  nondisplaced fractures of the lateral aspect of the left fourth through  sixth ribs.  --- End of Report ---      JESSICA YOST MD; Attending Radiologist This document has beenelectronically signed. Apr 10 2023  8:27PM  < end of copied text >   ECHOCARDIOGRAM: --------------------------- < from: TTE Echo Complete w/o Contrast w/ Doppler (23 @ 13:10) >  ACC: [id number] EXAM:  ECHO TTE WO CON COMP W DOPP                        PROCEDURE DATE:  2023      INTERPRETATION:  TRANSTHORACIC ECHOCARDIOGRAM REPORT    Patient Name:   MIKAYLA SULLIVAN Patient Location: [facility] Rec #:  [id number]        Accession #:      [id number] Account #:      [id number]        Height:           66.0 in 167.6 cm YOB: 1950          Weight:           165.6 lb 75.10 kg Patient Age:    72 years          BSA:              1.85 m² Patient Gender: F                 BP:               120/68 mmHg   Date of Exam:        2023 1:10:19 PM Sonographer:         NIYAH Referring Physician: GIOVANNY LÓPEZ  Procedure:     2D Echo/Doppler/Color Doppler Complete. Indications:   R94.31 - Abnormal electrocardiogram ECG/EKG Diagnosis:     R94.31 - Abnormal electrocardiogram ECG/EKG Study Details: Technically suboptimal study.    2D AND M-MODE MEASUREMENTS (normal ranges within parentheses): Left                 Normal   Aorta/Left         Normal Ventricle:                    Atrium: IVSd (2D):    1.20  (0.7-1.1) Aortic Root    3.10  (2.4-3.7)                cm             (2D):           cm LVPWd (2D):   1.29  (0.7-1.1) Left Atrium    3.50  (1.9-4.0)                cm         (2D):           cm LVIDd (2D):   4.31  (3.4-5.7) LA Vol Index   30.8                cm             (A4C):        ml/m² LVIDs (2D):   3.43            LA Vol Index   34.1                cm             (A2C):        ml/m² LV FS (2D):   20.5   (>25%)   LA Vol Index   32.7                 %             (BP):         ml/m² Relative Wall 0.60   (<0.42)  Right Thickness                     Ventricle:                               TAPSE:          2.30 cm  LV DIASTOLIC FUNCTION: MV Peak E: 1.12 m/s Decel Time:  195 msec MV Peak A: 1.11 m/s Septal E/e'  11.9 E/A Ratio: 1.02     Lateral E/e' 7.4 Septal e'  0.1 m/s Lateral e' 0.2 m/s  SPECTRAL DOPPLER ANALYSIS (where applicable): Mitral Valve: MV P1/2 Time: 56.68 msec MV Area, PHT: 3.88 cm²  Aortic Valve: AoV Max Ronald: 2.11 m/s AoV Peak P.7 mmHg AoV Mean P.3 mmHg  LVOT Vmax: 1.05 m/s LVOT VTI: 0.303 m LVOT Diameter: 2.04 cm  AoV Area, Vmax: 1.63 cm² AoV Area, VTI: 2.08 cm² AoV Area, Vmn:  Tricuspid Valveand PA/RV Systolic Pressure: TR Max Velocity: 2.50 m/s RA  Pressure: 5 mmHg RVSP/PASP: 30.1 mmHg   PHYSICIAN INTERPRETATION: Left Ventricle: Normal left ventricular size and wall thicknesses, with  normal systolic and diastolic function. Global LV systolic function was normal. Left ventricular ejection  fraction, by visual estimation, is 55 to 60%. Right Ventricle: Normal right ventricular size and function. Left Atrium: The left atrium is normal in size. Right Atrium: The right atriumis normal in size. Pericardium: There is no evidence of pericardial effusion. Mitral Valve: Structurally normal mitral valve, with normal leaflet  excursion. Mild thickening and calcification of the anterior and  posterior mitral valve leaflets. There is moderate mitral annular  calcification. Mild mitral valve regurgitation is seen. The MR jet is  eccentric laterally directed. Tricuspid Valve: Structurally normal tricuspid valve, with normal leaflet  excursion. Mild tricuspid regurgitation is visualized. Aortic Valve: Normal trileaflet aortic valve with normal opening.  Sclerotic aortic valve with normal opening. No evidence of aortic valve  regurgitation is seen. Pulmonic Valve: The pulmonic valve was not well visualized. No indication  of pulmonic valve regurgitation. Aorta: The aortic root and ascending aorta are structurally normal, with  no evidence of dilitation. Pulmonary Artery: The main pulmonary artery is normal in size.   Summary:  1. Left ventricular ejection fraction, by visual estimation, is 55 to 60%.  2. Normal global left ventricular systolic function.  3. Mild mitral valve regurgitation.  4. Mild thickening and calcification of the anterior and posterior mitral valve leaflets.  5. Moderate mitral annular calcification.  6. Mild tricuspid regurgitation.  7. Sclerotic aortic valve with normal opening.   [id number] Vic Argueta MD, formerly [facility] Electronically signed on 2023 at 9:14:08 PM      *** Final ***  < end of copied text >

## 2023-04-12 NOTE — CONSULT NOTE ADULT - ASSESSMENT
71 yo female with recurrent drop attacks, possible reflex syncope.  Education given regarding moving to a gravitationally safe position when prodrome occurs, increasing fluid intake, liberalized salt intake.  Would benefit from ILR placement to rule out dysrhythmia or bradycardic episodes, can be arranged with her primary Cardiologist, Dr. Fishman.  Ambulate and if no orthostasis present, can be discharged from CV perspective.

## 2023-04-13 VITALS
RESPIRATION RATE: 16 BRPM | SYSTOLIC BLOOD PRESSURE: 147 MMHG | TEMPERATURE: 98 F | OXYGEN SATURATION: 94 % | HEART RATE: 69 BPM | DIASTOLIC BLOOD PRESSURE: 96 MMHG

## 2023-04-13 LAB
CULTURE RESULTS: SIGNIFICANT CHANGE UP
SPECIMEN SOURCE: SIGNIFICANT CHANGE UP

## 2023-04-13 PROCEDURE — 99232 SBSQ HOSP IP/OBS MODERATE 35: CPT

## 2023-04-13 PROCEDURE — 99239 HOSP IP/OBS DSCHRG MGMT >30: CPT

## 2023-04-13 RX ADMIN — Medication 81 MILLIGRAM(S): at 11:14

## 2023-04-13 RX ADMIN — AMLODIPINE BESYLATE 5 MILLIGRAM(S): 2.5 TABLET ORAL at 05:10

## 2023-04-13 RX ADMIN — Medication 0.25 MILLIGRAM(S): at 00:25

## 2023-04-13 NOTE — DISCHARGE NOTE PROVIDER - HOSPITAL COURSE
this is a 73 yo F hx HTN presenting due to syncope. Patient was getting up from the toilet when her vision turned dark and she passed out. when she woke up she tried to get up and passed out again. no other prodrome of light handedness dizziness, palpitations, sob, cp, diaphoresis, nausea. had one similar past syncope years ago never worked up. may have hit her head and complains of occipital pain,  Denies AC use. Denies fevers, chills, dizziness, blurred vision, chest pain, cough, shortness of breath, abdominal pain, n/v/d/c, urinary symptoms, MSK pain, rash.  presenting bp soft 90/60. labs significant for wbc 16.9, creat 1.78, trop (-). no past labs to compare available. ekg sb 54. xrays show possible L lower ribs and r 7th rib fractures. given 500 cc ns in ed.     syncope  - could be 2/2 due vasovagal as the prior syncopal episodes occurred while the patient was in the bathroom having a bowel movement.   LIZBETH   leukocytosis- likely reactive- resolved  acute rib fractures- no acute intervention- pain control  -TTE- performed- EF 55-60%  -PT eval   - CT head negative  - Cardiology consulted-appreciate recommendations. Please advised the patient needs to follow up with her primary cardiologist upon discharge.      Vital Signs Last 24 Hrs  T(C): 36.6 (13 Apr 2023 04:50), Max: 36.9 (12 Apr 2023 10:30)  T(F): 97.9 (13 Apr 2023 04:50), Max: 98.5 (12 Apr 2023 10:30)  HR: 59 (13 Apr 2023 04:50) (53 - 62)  BP: 173/83 (13 Apr 2023 04:50) (130/72 - 173/83)  BP(mean): 92 (12 Apr 2023 21:29) (92 - 92)  RR: 16 (13 Apr 2023 04:50) (16 - 18)  SpO2: 98% (13 Apr 2023 04:50) (95% - 98%)    Parameters below as of 13 Apr 2023 04:50  Patient On (Oxygen Delivery Method): room air            General: NAD, resting comfortably in bed  HEENT: NC/AT, PERRLA, EOMI  Neck: thyroid normal, no nodules, no lymphadenopathy, no JVD  Lungs: CTA bilaterally, no rhonchi, no wheezes  Cardio: S1S2+, RRR, no murmurs  Abdominal: soft, NT, ND, BS+  Back: no CVA tenderness, no ulcers visualized in the back  Extremities: no edema, no calf tenderness, no ulcers in the feet  Neuro: AAOx3, CN 2-12 grossly intact.  sensation intact in all extremities, 5/5 strength

## 2023-04-13 NOTE — DISCHARGE NOTE PROVIDER - PROVIDER TOKENS
FREE:[LAST:[.],FIRST:[.],PHONE:[(   )    -],FAX:[(   )    -],ADDRESS:[Please follow up with your primary care doctor as well as your cardiologist],FOLLOWUP:[1-3 days]]

## 2023-04-13 NOTE — PROGRESS NOTE ADULT - SUBJECTIVE AND OBJECTIVE BOX
CC: Patient is a 72y old  Female who presents with a chief complaint of     Patient seen and examined at bedside, No acute overnight events.    ROS: Denies fever, nausea, vomiting, chest pain, SOB, abdominal pain, diarrhea and constipation    Vital Sign  Vital Signs Last 24 Hrs  T(C): 36.9 (12 Apr 2023 10:30), Max: 37.2 (11 Apr 2023 15:12)  T(F): 98.5 (12 Apr 2023 10:30), Max: 98.9 (11 Apr 2023 15:12)  HR: 59 (12 Apr 2023 10:30) (48 - 66)  BP: 166/88 (12 Apr 2023 10:30) (121/69 - 174/75)  BP(mean): --  RR: 17 (12 Apr 2023 10:30) (17 - 18)  SpO2: 96% (12 Apr 2023 10:30) (96% - 98%)    Parameters below as of 12 Apr 2023 10:30  Patient On (Oxygen Delivery Method): room air        Physical Exam:   Gen: NAD  HEENT: NCAT, EOMI, PERRLA, Pupils_  CVS: RRR, +S1/S2, no murmurs, rubs or gallops appreciated  Lungs: CTAB, no wheeze, rales, rhonchi  Abdomen: +BS, soft, ND, NT. no palpable flank tenderness or mass, no CVA tenderness  Ext: No cyanosis, edema or calf tenderness  Neuro: AAOx3, no focal deficits.    Labs:                        13.6   8.24  )-----------( 335      ( 12 Apr 2023 05:30 )             41.5   04-12    142  |  114<H>  |  18  ----------------------------<  85  3.8   |  23  |  0.86    Ca    9.4      12 Apr 2023 05:30  Phos  3.0     04-11  Mg     2.3     04-11    TPro  6.7  /  Alb  3.3  /  TBili  0.5  /  DBili  x   /  AST  24  /  ALT  45  /  AlkPhos  73  04-11      04-11 @ 07:01  -  04-12 @ 07:00  --------------------------------------------------------  IN: 2530 mL / OUT: 2300 mL / NET: 230 mL    04-12 @ 07:01  - 04-12 @ 14:13  --------------------------------------------------------  IN: 120 mL / OUT: 0 mL / NET: 120 mL        Radiology:  *Pull    Medications:  MEDICATIONS  (STANDING):  amLODIPine   Tablet 5 milliGRAM(s) Oral daily  aspirin  chewable 81 milliGRAM(s) Oral daily  simvastatin 20 milliGRAM(s) Oral at bedtime    MEDICATIONS  (PRN):  acetaminophen     Tablet .. 650 milliGRAM(s) Oral every 6 hours PRN Temp greater or equal to 38C (100.4F), Mild Pain (1 - 3)  ALPRAZolam 0.25 milliGRAM(s) Oral four times a day PRN anxiety  aluminum hydroxide/magnesium hydroxide/simethicone Suspension 30 milliLiter(s) Oral every 4 hours PRN Dyspepsia  melatonin 3 milliGRAM(s) Oral at bedtime PRN Insomnia  ondansetron Injectable 4 milliGRAM(s) IV Push every 8 hours PRN Nausea and/or Vomiting  traMADol 50 milliGRAM(s) Oral every 6 hours PRN Moderate Pain (4 - 6)  
Patient is a 72y old  Female who presents with a chief complaint of syncope     PAST MEDICAL & SURGICAL HISTORY:  Hypertension    INTERVAL HISTORY: in no acute distress   	  MEDICATIONS:  MEDICATIONS  (STANDING):  amLODIPine   Tablet 5 milliGRAM(s) Oral daily  aspirin  chewable 81 milliGRAM(s) Oral daily  simvastatin 20 milliGRAM(s) Oral at bedtime    MEDICATIONS  (PRN):  acetaminophen     Tablet .. 650 milliGRAM(s) Oral every 6 hours PRN Temp greater or equal to 38C (100.4F), Mild Pain (1 - 3)  ALPRAZolam 0.25 milliGRAM(s) Oral four times a day PRN anxiety  aluminum hydroxide/magnesium hydroxide/simethicone Suspension 30 milliLiter(s) Oral every 4 hours PRN Dyspepsia  melatonin 3 milliGRAM(s) Oral at bedtime PRN Insomnia  ondansetron Injectable 4 milliGRAM(s) IV Push every 8 hours PRN Nausea and/or Vomiting  traMADol 50 milliGRAM(s) Oral every 6 hours PRN Moderate Pain (4 - 6)    Vitals:  T(F): 97.9 (04-13-23 @ 10:40), Max: 97.9 (04-12-23 @ 16:10)  HR: 69 (04-13-23 @ 10:40) (53 - 69)  BP: 147/96 (04-13-23 @ 10:40) (130/72 - 173/83)  RR: 16 (04-13-23 @ 10:40) (16 - 18)  SpO2: 94% (04-13-23 @ 10:40) (94% - 98%)    04-12 @ 07:01  -  04-13 @ 07:00  --------------------------------------------------------  IN:    Oral Fluid: 780 mL  Total IN: 780 mL    OUT:  Total OUT: 0 mL    Total NET: 780 mL    Weight (kg): 75.1 (04-11 @ 02:23)  BMI (kg/m2): 26.7 (04-11 @ 02:23)    PHYSICAL EXAM:  Neuro: Awake, responsive  CV: S1 S2 RRR  Lungs: CTABL  GI: Soft, BS +, ND, NT  Extremities: No edema    TELEMETRY: sinus    RADIOLOGY: < from: CT Chest No Cont (04.10.23 @ 20:03) >  Probable nondisplaced fracture of the lateral right seventh rib. Possible   nondisplaced fractures of the lateral aspect of the left fourth through   sixth ribs.    < end of copied text >    DIAGNOSTIC TESTING:    [x ] Echocardiogram: < from: TTE Echo Complete w/o Contrast w/ Doppler (04.11.23 @ 13:10) >  Left Ventricle: Normal left ventricular size and wall thicknesses, with   normal systolic and diastolic function.  Global LV systolic function was normal. Left ventricular ejection   fraction, by visual estimation, is 55 to 60%.  Right Ventricle: Normal right ventricular size and function.  Left Atrium: The left atrium is normal in size.  Right Atrium: The right atriumis normal in size.  Pericardium: There is no evidence of pericardial effusion.  Mitral Valve: Structurally normal mitral valve, with normal leaflet   excursion. Mild thickening and calcification of the anterior and   posterior mitral valve leaflets. There is moderate mitral annular   calcification. Mild mitral valve regurgitation is seen. The MR jet is   eccentric laterally directed.  Tricuspid Valve: Structurally normal tricuspid valve, with normal leaflet   excursion. Mild tricuspid regurgitation is visualized.  Aortic Valve: Normal trileaflet aortic valve with normal opening.   Sclerotic aortic valve with normal opening. No evidence of aortic valve   regurgitation is seen.  Pulmonic Valve: The pulmonic valve was not well visualized. No indication   of pulmonic valve regurgitation.  Aorta: The aortic root and ascending aorta are structurally normal, with   no evidence of dilitation.  Pulmonary Artery: The main pulmonary artery is normal in size.      Summary:   1. Left ventricular ejection fraction, by visual estimation, is 55 to   60%.   2. Normal global left ventricular systolic function.   3. Mild mitral valve regurgitation.   4. Mild thickening and calcification of the anterior and posterior   mitral valve leaflets.   5. Moderate mitral annular calcification.   6. Mild tricuspid regurgitation.   7. Sclerotic aortic valve with normal opening.    < end of copied text >    LABS:	 	    CARDIAC MARKERS:  Troponin I, High Sensitivity Result: 33.8 ng/L (04-11 @ 05:20)  Troponin I, High Sensitivity Result: 45.0 ng/L (04-10 @ 17:45)    12 Apr 2023 05:30    142    |  114    |  18     ----------------------------<  85     3.8     |  23     |  0.86   11 Apr 2023 05:20    141    |  113    |  33     ----------------------------<  84     3.7     |  20     |  1.27   10 Apr 2023 17:45    141    |  111    |  27     ----------------------------<  136    3.9     |  23     |  1.78     Ca    9.4        12 Apr 2023 05:30                        13.6   8.24  )-----------( 335      ( 12 Apr 2023 05:30 )             41.5 ,                       12.0   9.29  )-----------( 305      ( 11 Apr 2023 05:20 )             37.0 ,                       14.0   16.95 )-----------( 321      ( 10 Apr 2023 17:45 )             43.4   Lipid Profile: 04-11 @ 05:20  HDL/Total Cholesterol: --  HDL Chol:              81 mg/dL  Serum Chol:            158 mg/dL  Direct LDL:            --  Triglycerides:         81 mg/dL    TSH: Thyroid Stimulating Hormone, Serum: 0.405 uIU/mL (04-11 @ 05:20)                
CC: Patient is a 72y old  Female who presents with a chief complaint of     Patient seen and examined at bedside, No acute overnight events.    ROS: Denies fever, nausea, vomiting, chest pain, SOB, abdominal pain, diarrhea and constipation    Vital Sign  Vital Signs Last 24 Hrs  T(C): 37.2 (11 Apr 2023 15:12), Max: 37.2 (11 Apr 2023 15:12)  T(F): 98.9 (11 Apr 2023 15:12), Max: 98.9 (11 Apr 2023 15:12)  HR: 58 (11 Apr 2023 15:12) (58 - 79)  BP: 121/69 (11 Apr 2023 15:12) (118/70 - 144/63)  BP(mean): 84 (11 Apr 2023 02:23) (84 - 84)  RR: 18 (11 Apr 2023 15:12) (16 - 20)  SpO2: 97% (11 Apr 2023 15:12) (96% - 100%)    Parameters below as of 11 Apr 2023 15:12  Patient On (Oxygen Delivery Method): room air        Physical Exam:   Gen: NAD  HEENT: NCAT, EOMI, PERRLA, Pupils_  CVS: RRR, +S1/S2, no murmurs, rubs or gallops appreciated, chest wall tenderness to palpation  Lungs: CTAB, no wheeze, rales, rhonchi  Abdomen: +BS, soft, ND, NT. no palpable flank tenderness or mass, no CVA tenderness  Ext: No cyanosis, edema or calf tenderness  Neuro: AAOx3, no focal deficits.    Labs:                        12.0   9.29  )-----------( 305      ( 11 Apr 2023 05:20 )             37.0   04-11    141  |  113<H>  |  33<H>  ----------------------------<  84  3.7   |  20<L>  |  1.27    Ca    8.6      11 Apr 2023 05:20  Phos  3.0     04-11  Mg     2.3     04-11    TPro  6.7  /  Alb  3.3  /  TBili  0.5  /  DBili  x   /  AST  24  /  ALT  45  /  AlkPhos  73  04-11      04-10 @ 07:01  -  04-11 @ 07:00  --------------------------------------------------------  IN: 0 mL / OUT: 800 mL / NET: -800 mL    04-11 @ 07:01  -  04-11 @ 17:45  --------------------------------------------------------  IN: 880 mL / OUT: 600 mL / NET: 280 mL        Radiology:  *Pull    Medications:  MEDICATIONS  (STANDING):  aspirin  chewable 81 milliGRAM(s) Oral daily  simvastatin 20 milliGRAM(s) Oral at bedtime  sodium chloride 0.9%. 1000 milliLiter(s) (150 mL/Hr) IV Continuous <Continuous>    MEDICATIONS  (PRN):  acetaminophen     Tablet .. 650 milliGRAM(s) Oral every 6 hours PRN Temp greater or equal to 38C (100.4F), Mild Pain (1 - 3)  ALPRAZolam 0.25 milliGRAM(s) Oral four times a day PRN anxiety  aluminum hydroxide/magnesium hydroxide/simethicone Suspension 30 milliLiter(s) Oral every 4 hours PRN Dyspepsia  melatonin 3 milliGRAM(s) Oral at bedtime PRN Insomnia  ondansetron Injectable 4 milliGRAM(s) IV Push every 8 hours PRN Nausea and/or Vomiting  traMADol 50 milliGRAM(s) Oral every 6 hours PRN Moderate Pain (4 - 6)

## 2023-04-13 NOTE — DISCHARGE NOTE NURSING/CASE MANAGEMENT/SOCIAL WORK - PATIENT PORTAL LINK FT
You can access the FollowMyHealth Patient Portal offered by [facility] by registering at the following website: [email or web address]. By joining [facility]’s FollowMyHealth portal, you will also be able to view your health information using other applications (apps) compatible with our system.

## 2023-04-13 NOTE — PROGRESS NOTE ADULT - ASSESSMENT
71 yo female with HTN Hx p/w syncope while getting up from the toilet, had one similar syncope years ago, never worked up.  CT head negative  CT chest with possible L lower ribs and r 7th rib fractures  TTE unremarkable  No significant arrhythmias on tele  Would benefit from ILR placement to rule out dysrhythmia or bradycardic episodes, can be arranged with her primary Cardiologist, Dr. Fishman.  Ambulate and if no orthostasis present, can be discharged from CV perspective.  
this is a 73 yo F hx HTN presenting due to syncope. Patient was getting up from the toilet when her vision turned dark and she passed out. when she woke up she tried to get up and passed out again. no other prodrome of light handedness dizziness, palpitations, sob, cp, diaphoresis, nausea. had one similar past syncope years ago never worked up. may have hit her head and complains of occipital pain,  Denies AC use. Denies fevers, chills, dizziness, blurred vision, chest pain, cough, shortness of breath, abdominal pain, n/v/d/c, urinary symptoms, MSK pain, rash.  presenting bp soft 90/60. labs significant for wbc 16.9, creat 1.78, trop (-). no past labs to compare available. ekg sb 54. xrays show possible L lower ribs and r 7th rib fractures. given 500 cc ns in ed.     syncope   LIZBETH   leukocytosis- likely reactive- resolved  acute rib fractures- no acute intervention- pain control  -admit to telemetry   -static vitals  -TTE- performed- EF 55-60%  -tsh, lipid panel, a1c  -kidney bladder US  -pain control w tylenol  -regular diet   -scds  -PT eval   - CT head negative  - Cardiology consulted    Likely discharge in the AM  
this is a 71 yo F hx HTN presenting due to syncope. Patient was getting up from the toilet when her vision turned dark and she passed out. when she woke up she tried to get up and passed out again. no other prodrome of light handedness dizziness, palpitations, sob, cp, diaphoresis, nausea. had one similar past syncope years ago never worked up. may have hit her head and complains of occipital pain,  Denies AC use. Denies fevers, chills, dizziness, blurred vision, chest pain, cough, shortness of breath, abdominal pain, n/v/d/c, urinary symptoms, MSK pain, rash.  presenting bp soft 90/60. labs significant for wbc 16.9, creat 1.78, trop (-). no past labs to compare available. ekg sb 54. xrays show possible L lower ribs and r 7th rib fractures. given 500 cc ns in ed.     syncope   LIZBETH   leukocytosis- likely reactive- resolved  acute rib fractures  -admit to telemetry   -soft BP. check orthostatic vitals  -TTE  -tsh, lipid panel, a1c  -kidney bladder US  -UA not suggestive of UTI  -s/p 500 cc ns in ed, CW IV hydration. monitor creat   -pain control w tylenol, ultram  -regular diet   -scds  -PT eval

## 2023-04-13 NOTE — DISCHARGE NOTE PROVIDER - CARE PROVIDER_API CALL
., .  Please follow up with your primary care doctor as well as your cardiologist  Phone: (   )    -  Fax: (   )    -  Follow Up Time: 1-3 days

## 2023-04-13 NOTE — CHART NOTE - NSCHARTNOTEFT_GEN_A_CORE
Chart reviewed and events to date noted. Initially evaluated by PT on 4/11/23 with discharge recommendation for Outpatient PT. Continued to receive on-going PT. Case Management documented planned discharge to home as of 4/12. New order for PT on 4/12 made. Duplicate order completed.

## 2023-04-13 NOTE — DISCHARGE NOTE PROVIDER - NSDCCPCAREPLAN_GEN_ALL_CORE_FT
PRINCIPAL DISCHARGE DIAGNOSIS  Diagnosis: Syncope  Assessment and Plan of Treatment: Please follow up with your primary care doctor as well as your cardiologist.      SECONDARY DISCHARGE DIAGNOSES  Diagnosis: Multiple fractures of ribs of left side  Assessment and Plan of Treatment:

## 2023-04-13 NOTE — DISCHARGE NOTE PROVIDER - NSDCMRMEDTOKEN_GEN_ALL_CORE_FT
ALPRAZolam 0.25 mg oral tablet: 1 tab(s) orally 4 times a day, As needed, Anxiety  amLODIPine 5 mg oral tablet: 1 tab(s) orally once a day  aspirin 81 mg oral tablet, chewable: 1 tab(s) orally once a day  hydrochlorothiazide 25 mg oral tablet: 1 tab(s) orally once a day  simvastatin 20 mg oral tablet: 1 tab(s) orally once a day (at bedtime)

## 2023-04-17 DIAGNOSIS — N17.9 ACUTE KIDNEY FAILURE, UNSPECIFIED: ICD-10-CM

## 2023-04-17 DIAGNOSIS — I10 ESSENTIAL (PRIMARY) HYPERTENSION: ICD-10-CM

## 2023-04-17 DIAGNOSIS — S22.43XA MULTIPLE FRACTURES OF RIBS, BILATERAL, INITIAL ENCOUNTER FOR CLOSED FRACTURE: ICD-10-CM
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
## 2023-04-17 DIAGNOSIS — R55 SYNCOPE AND COLLAPSE: ICD-10-CM

## 2023-04-17 DIAGNOSIS — Z88.0 ALLERGY STATUS TO PENICILLIN: ICD-10-CM

## 2023-04-17 DIAGNOSIS — R00.1 BRADYCARDIA, UNSPECIFIED: ICD-10-CM

## 2023-04-17 DIAGNOSIS — Z79.82 LONG TERM (CURRENT) USE OF ASPIRIN: ICD-10-CM
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
## 2023-04-17 DIAGNOSIS — Z91.041 RADIOGRAPHIC DYE ALLERGY STATUS: ICD-10-CM

## 2024-02-07 NOTE — DISCHARGE NOTE NURSING/CASE MANAGEMENT/SOCIAL WORK - NSDCPEFALRISK_GEN_ALL_CORE
For information on Fall & Injury Prevention, visit: [email or web address] OR  [email or web address] OR  https://www.cdc.gov/steadi/patient.html
Statement Selected

## 2025-06-07 NOTE — ED ADULT NURSE NOTE - NS ED NURSE RECORD ANOTHER HT AND WT
Yes presents from Family Practice 2/2 SOB, also with +ascites/ abd distension and bilateral LE edema x 1 week, CXR revealed mod right pleural effusion and small left pleural effusion; now present to acute PT